# Patient Record
(demographics unavailable — no encounter records)

---

## 2024-10-20 NOTE — HISTORY OF PRESENT ILLNESS
[de-identified] : Patient is here for evaluation of left shoulder pain Affecting quality of life Has pain and weakness with loss of rom Wakes up at night due to pain  has done pt and injection  NAD Left shoulder: TTP ant GH joint, bicipital groove FF 0-140 (passive 175) ER 40 IR L5 Pain with terminal rom Weakness to abduction and ER Pos Impingement Pos Ch[place]arria Pos Cross Arm Adduction Negative instability Positive scapula dyskinesia  XRay Left shoulder negative for fracture, dislocation, arthritis  Plan went over findings explained the imaging cont pt as he has been doing can't get mri or ct arthrogram, allergies and metal object in body explained diagnostic arthroscopy with possible rc, labral, biceps tear  left shoulder arthroscopy, rotator cuff repair, decompression, distal clavicle excision, possible open biceps tenodesis  Operative and nonoperative options discussed with patient. Surgical risks, benefits, and alternatives explained. Surgical risks include but are not exclusive to bleeding, infection, neurovascular damage, continued pain, stiffness, scarring, rsd, dvt/pe, potential failure of surgery that may require further surgery in the future. I went over incisions and rehabilitation. All questions answered.

## 2024-10-20 NOTE — HISTORY OF PRESENT ILLNESS
[de-identified] : Patient is here for evaluation of left shoulder pain Affecting quality of life Has pain and weakness with loss of rom Wakes up at night due to pain  has done pt and injection  NAD Left shoulder: TTP ant GH joint, bicipital groove FF 0-140 (passive 175) ER 40 IR L5 Pain with terminal rom Weakness to abduction and ER Pos Impingement Pos Ch[place]arria Pos Cross Arm Adduction Negative instability Positive scapula dyskinesia  XRay Left shoulder negative for fracture, dislocation, arthritis  Plan went over findings explained the imaging cont pt as he has been doing can't get mri or ct arthrogram, allergies and metal object in body explained diagnostic arthroscopy with possible rc, labral, biceps tear  left shoulder arthroscopy, rotator cuff repair, decompression, distal clavicle excision, possible open biceps tenodesis  Operative and nonoperative options discussed with patient. Surgical risks, benefits, and alternatives explained. Surgical risks include but are not exclusive to bleeding, infection, neurovascular damage, continued pain, stiffness, scarring, rsd, dvt/pe, potential failure of surgery that may require further surgery in the future. I went over incisions and rehabilitation. All questions answered.

## 2024-10-30 NOTE — ASSESSMENT
[FreeTextEntry1] : This is a 56-year-old male with complaints of bilateral shoulder pain. He is pending surgery in February with orthopedics. The pain makes it difficult for him to perform his ADLs and limits his ROM. Physical therapy exacerbated his symptoms in the past.  He would like to trial medication management with the hope of provides some relief of his symptoms until the surgery.  I will send gabapentin 300 mg 3 times daily to pharmacy.  I outlined the instructions to properly titrate up to 3 times daily dosing.  Patient will follow-up in 4 weeks for reassessment. All this patients questions were answered and the conversation was understood well.  Entered by Tammi Granger, acting as scribe for Dr. Correia.  Documentation recorded by the scribe, in my presence, accurately reflects the service I personally performed, and the decisions made by me with my edits as appropriate.     Thank you for allowing me to assist in the management of this patient.     Best Regards,     Genesis Correia M.D., FAAPMR     Diplomate, American Board of Physical Medicine and Rehabilitation Diplomate, American Board of Pain Medicine

## 2024-10-30 NOTE — HISTORY OF PRESENT ILLNESS
[FreeTextEntry1] : ORIGINAL PRESENTATION: Mr. Donaldson is a 56-year-old man presenting with a chief complaint of neck and back pain. In regard to the neck, pain radiates into the right upper extremity. He denies any numbness, tingling or weakness in the right upper extremity. In regard to the back, pain radiates into the hips and lower extremities bilaterally with associated numbness in the left leg and tingling in both feet. Of note, he has not begun conservative treatment as he recently underwent cyst removal from the left upper thoracic region and is scheduled to undergo skin grafting.  The patient has had this pain for 3 years, worsening over the past 4 months. Patient describes pain as moderate to severe. During the last month the pain has been nearly constant with symptoms worsening evening. Pain described as sharp, shooting, dull/aching, throbbing. Pain is associated with numbness/pins and needles into the feet, and left leg. Patient has weakness in the lower extremities, with falling. Pain is increased with standing, walking, exercise. Pain is not changed with lying down, sitting, relaxation, coughing/sneezing, bowel movements. Bowel or bladder habits have not changed.  ACTIVITIES: Patient could walk 1 blocks before the pain starts. Patient can sit 30 minutes before pain starts. Patient can stand 30 minutes before pain starts. The patient seldom lies down because of pain. Patient uses no assistive device at this time. Patient has difficulty going to work, doing outside work or shopping, participating in recreational activities, exercising at this time.  PRIOR PAIN TREATMENTS: No relief with physical therapy, exercise.  PRIOR PAIN MEDICATIONS: Codeine, OxyContin, Tylenol, aspirin.  PATIENT PRESENTS FOR FOLLOW UP: He was last seen in the office on 10/26/2022 for complaints of cervical radicular pain. He returns to the office after long delay for a new complaint. He is referred back to the office by orthopedics for complaints of bilateral shoulder pain. He underwent a shoulder injection in July with orthopedics which provided him with minimal relief. He has limited ROM and he is not able to put on his shirt. He is scheduled to undergo surgery in February but is hoping to find something that provides relief until then. He has trialed topical creams with no relief of his symptoms. PT exacerbated his pain.  We discussed trialing tramadol, but I advised patient that he would need to have a clean urine tox sample.  Patient admitted that the urine tox screen will come back positive for recreational marijuana. I informed him that we would not be able to prescribe narcotics if he is positive for THC.  He voices understanding and we will trial an alternative medication  Of note, patient has a history of stomach ulcer.

## 2024-10-30 NOTE — PHYSICAL EXAM
[Normal Coordination] : normal coordination [Normal DTR UE/LE] : normal DTR UE/LE  [Normal Sensation] : normal sensation [Normal Mood and Affect] : normal mood and affect [Oriented] : oriented [Able to Communicate] : able to communicate [Well Developed] : well developed [Well Nourished] : well nourished [Normal Skin] : normal skin [No Rash] : no rash [No Ulcers] : no ulcers [No Lesions] : no lesions [Flexion] : flexion [Extension] : extension [] : patient ambulates without assistive device [FreeTextEntry3] : Midline wound T-spine covered with bandaid [FreeTextEntry9] : Worse on flexion

## 2024-12-04 NOTE — ASSESSMENT
[FreeTextEntry1] : This is a 56-year-old male with complaints of cervical pain and radicular symptoms and a more recent complaint of bilateral shoulder pain, L>R. He is pending L shoulder surgery in February with orthopedics. The pain makes it difficult for him to perform his ADLs and limits his ROM. Physical therapy exacerbated his symptoms in the past. He will continue with gabapentin, which was refilled for him today. He will also continue with tizanidine PRN. I have prescribed him Lidoderm patches in addition to a compound cream. Patient will follow-up in 3 months for reassessment. All of this patient's questions were answered and the conversation was understood well.  TOR Russell MD

## 2024-12-04 NOTE — HISTORY OF PRESENT ILLNESS
[FreeTextEntry1] : ORIGINAL PRESENTATION: Mr. Donaldson is a 56-year-old man presenting with a chief complaint of neck and back pain. In regard to the neck, pain radiates into the right upper extremity. He denies any numbness, tingling or weakness in the right upper extremity. In regard to the back, pain radiates into the hips and lower extremities bilaterally with associated numbness in the left leg and tingling in both feet. Of note, he has not begun conservative treatment as he recently underwent cyst removal from the left upper thoracic region and is scheduled to undergo skin grafting.  The patient has had this pain for 3 years, worsening over the past 4 months. Patient describes pain as moderate to severe. During the last month the pain has been nearly constant with symptoms worsening evening. Pain described as sharp, shooting, dull/aching, throbbing. Pain is associated with numbness/pins and needles into the feet, and left leg. Patient has weakness in the lower extremities, with falling. Pain is increased with standing, walking, exercise. Pain is not changed with lying down, sitting, relaxation, coughing/sneezing, bowel movements. Bowel or bladder habits have not changed.  ACTIVITIES: Patient could walk 1 blocks before the pain starts. Patient can sit 30 minutes before pain starts. Patient can stand 30 minutes before pain starts. The patient seldom lies down because of pain. Patient uses no assistive device at this time. Patient has difficulty going to work, doing outside work or shopping, participating in recreational activities, exercising at this time.  PRIOR PAIN TREATMENTS: No relief with physical therapy, exercise.  PRIOR PAIN MEDICATIONS: Codeine, OxyContin, Tylenol, aspirin.  PATIENT PRESENTS FOR FOLLOW UP: He is under our care for complaints of cervical pain and radicular symptoms and a more recent complaint of bilateral shoulder pain, L>R. He underwent a Left shoulder injection in August with orthopedics which provided him with minimal relief. He has limited ROM and he is not able to put his shirt on. Patient has been taking gabapentin 400 mg 3-4 times daily which is helping him. He has been taking tizanidine PRN. He has trialed Voltaren gel with no relief of his symptoms. He is scheduled to undergo L shoulder surgery in February with Dr. Barajas.  Of note, patient has a history of stomach ulcers and therefore he cannot take NSAID's.

## 2024-12-04 NOTE — PHYSICAL EXAM
[Normal Coordination] : normal coordination [Normal DTR UE/LE] : normal DTR UE/LE  [Normal Sensation] : normal sensation [Normal Mood and Affect] : normal mood and affect [Oriented] : oriented [Able to Communicate] : able to communicate [Normal Skin] : normal skin [No Rash] : no rash [No Ulcers] : no ulcers [No Lesions] : no lesions [Well Developed] : well developed [Well Nourished] : well nourished [Flexion] : flexion [Extension] : extension [] : non-antalgic [FreeTextEntry3] : Midline wound T-spine covered with bandaid [FreeTextEntry9] : Worse on flexion

## 2025-03-06 NOTE — HISTORY OF PRESENT ILLNESS
[de-identified] : Pt is s/p left shoulder arthroscopy Doing well. Pain controlled  NAD Left shoulder Incisions healed Mild diffuse TTP Compartments soft and NT ROM limited secondary to pain NVI  s/p left shoulder arthroscopy went over the surgery in detail will start pt, protocol provided continue pain control as needed fu in 1 month all questions answered
16

## 2025-03-12 NOTE — ASSESSMENT
[FreeTextEntry1] : This is a 56-year-old male with complaints of cervical pain and radicular symptoms and a more recent complaint of bilateral shoulder pain, L>R. He underwent L shoulder surgery in February by Dr. Barajas. He will continue with PT. He will continue with gabapentin, tizanidine PRN, Lidoderm patches in addition to a compound cream. He does not need any refills today. Patient will follow-up in 3 months for reassessment. All of this patient's questions were answered and the conversation was understood well.  ATTESTATION: I personally reviewed with the PA, this patient's history and physical exam findings, as documented above. I have discussed the relevant areas of concern, having direct implications to the presenting problems and illnesses, and I have personally examined all pertinent and positive and negative findings, which impact their pain management treatment.   I, Dr. Correia, attest that this documentation has been prepared by Eileen May PA-C under my presence and direction.  TOR Russell MD

## 2025-03-12 NOTE — HISTORY OF PRESENT ILLNESS
[FreeTextEntry1] : ORIGINAL PRESENTATION: Mr. Donaldson is a 56-year-old man presenting with a chief complaint of neck and back pain. In regard to the neck, pain radiates into the right upper extremity. He denies any numbness, tingling or weakness in the right upper extremity. In regard to the back, pain radiates into the hips and lower extremities bilaterally with associated numbness in the left leg and tingling in both feet. Of note, he has not begun conservative treatment as he recently underwent cyst removal from the left upper thoracic region and is scheduled to undergo skin grafting.  The patient has had this pain for 3 years, worsening over the past 4 months. Patient describes pain as moderate to severe. During the last month the pain has been nearly constant with symptoms worsening evening. Pain described as sharp, shooting, dull/aching, throbbing. Pain is associated with numbness/pins and needles into the feet, and left leg. Patient has weakness in the lower extremities, with falling. Pain is increased with standing, walking, exercise. Pain is not changed with lying down, sitting, relaxation, coughing/sneezing, bowel movements. Bowel or bladder habits have not changed.  ACTIVITIES: Patient could walk 1 blocks before the pain starts. Patient can sit 30 minutes before pain starts. Patient can stand 30 minutes before pain starts. The patient seldom lies down because of pain. Patient uses no assistive device at this time. Patient has difficulty going to work, doing outside work or shopping, participating in recreational activities, exercising at this time.  PRIOR PAIN TREATMENTS: No relief with physical therapy, exercise.  PRIOR PAIN MEDICATIONS: Codeine, OxyContin, Tylenol, aspirin.  PATIENT PRESENTS FOR FOLLOW UP: He is under our care for complaints of cervical pain and radicular symptoms and a more recent complaint of bilateral shoulder pain, L>R. Since his last visit here, he underwent left shoulder surgery by Dr. Barajas. Surgery took place last month. He is currently recovering from it. He is undergoing PT for the shoulder. Patient continues to take gabapentin 400 mg 3-4 times daily which is helping him. He has been taking tizanidine PRN. He has also been applying Lidoderm patches and using a compound cream which have both been helpful.  Of note, patient has a history of stomach ulcers and therefore he cannot take NSAID's.

## 2025-04-29 NOTE — DISCUSSION/SUMMARY
[de-identified] : Postop visit #2/left shoulder pain follow-up  HPI Patient is a 56-year-old male who reports to the office for subsequent reevaluation of his left shoulder.  This is his second postoperative visit.  He is status post left shoulder arthroscopy/extensive debridement and FARAZ done on 2/26/2025 by Dr. Barajas.  Patient states that his range of motion has been improving.  His pain has been well-controlled as well.  Admits to mild stiffness upon certain range of motion.  He is right-hand dominant.  Denies any numbness or tingling.  Left shoulder exam is as follows: No swelling noted.  No erythema or ecchymosis.  Well-healed incision sites.  No tenderness to palpation.  Active forward flexion/abduction to approximately 170 degrees, passive forward flexion/abduction to approximately 180 degrees with mild stiffness.  Internal rotation to L4 and external rotation to 85 degrees with mild stiffness.  There is decent strength.  Light touch intact throughout.  Neurovascular intact.  Assessment/plan Patient is doing well status post-surgery.  His range of motion and strength have been improving.  He will continue formal PT as directed.  The shoulder conditioning program from the [facility] was given to the patient so they may try that at home.  Follow-up on as-needed basis.  May consider injections in future if still symptomatic.  All question/concerns were answered in detail.